# Patient Record
Sex: MALE | Race: BLACK OR AFRICAN AMERICAN | Employment: FULL TIME
[De-identification: names, ages, dates, MRNs, and addresses within clinical notes are randomized per-mention and may not be internally consistent; named-entity substitution may affect disease eponyms.]

---

## 2024-05-28 ENCOUNTER — HOSPITAL ENCOUNTER (EMERGENCY)
Facility: HOSPITAL | Age: 34
Discharge: HOME | End: 2024-05-29

## 2024-05-28 VITALS
TEMPERATURE: 97.9 F | RESPIRATION RATE: 16 BRPM | DIASTOLIC BLOOD PRESSURE: 88 MMHG | BODY MASS INDEX: 21.28 KG/M2 | SYSTOLIC BLOOD PRESSURE: 127 MMHG | WEIGHT: 152 LBS | OXYGEN SATURATION: 97 % | HEART RATE: 96 BPM | HEIGHT: 71 IN

## 2024-05-28 DIAGNOSIS — T16.2XXA FOREIGN BODY OF LEFT EAR, INITIAL ENCOUNTER: Primary | ICD-10-CM

## 2024-05-28 PROCEDURE — 69200 CLEAR OUTER EAR CANAL: CPT

## 2024-05-28 PROCEDURE — 99282 EMERGENCY DEPT VISIT SF MDM: CPT

## 2024-05-28 ASSESSMENT — PAIN - FUNCTIONAL ASSESSMENT: PAIN_FUNCTIONAL_ASSESSMENT: NONE - DENIES PAIN

## 2024-05-29 PROCEDURE — 69200 CLEAR OUTER EAR CANAL: CPT

## 2024-05-29 NOTE — ED TRIAGE NOTES
Patient states that something flew into left ear. Patient states that he can still feel it flying around in ear.

## 2024-05-29 NOTE — ED PROVIDER NOTES
[facility] EMERGENCY DEPT  EMERGENCY DEPARTMENT ENCOUNTER       Pt Name: Artem Gonzalez  MRN: [id number]  Birthdate 1990  Date of evaluation: 5/28/2024  PCP: No primary care provider on file.  Note Started: 12:32 AM 5/29/24     CHIEF COMPLAINT       Chief Complaint   Patient presents with    Foreign Body in Ear        HISTORY OF PRESENT ILLNESS: 1 or more elements      History From: Patient  HPI Limitations: None  Chronic Conditions: None   Social Determinants affecting Dx or Tx: None       Artem Gonzalez is a 34 y.o. male who presents to ED c/o foreign body sensation to left ear.  Patient reports he felt a bug fly into his ear earlier today and it has not come out.  Patient denies headache, fever/chills, URI symptoms.     Nursing Notes were all reviewed and agreed with or any disagreements were addressed in the HPI.    PAST HISTORY     Past Medical History:  No past medical history on file.    Past Surgical History:  No past surgical history on file.    Family History:  No family history on file.    Social History:  Social History     Socioeconomic History    Marital status: Single       Allergies:  No Known Allergies    CURRENT MEDICATIONS      No current facility-administered medications for this encounter.     No current outpatient medications on file.          PHYSICAL EXAM      Vitals:    05/28/24 2251   BP: 127/88   Pulse: 96   Resp: 16   Temp: 97.9 °F (36.6 °C)   TempSrc: Oral   SpO2: 97%   Weight: 68.9 kg (152 lb)   Height: 1.803 m (5' 11\")     Physical Exam  Vitals and nursing note reviewed.   Constitutional:       General: He is not in acute distress.     Appearance: Normal appearance. He is not ill-appearing.   HENT:      Head: Normocephalic and atraumatic.      Right Ear: Tympanic membrane, ear canal and external ear normal.      Left Ear: Tympanic membrane and external ear normal. A foreign body is present.      Ears:      Comments: Winged insect to left ear     Mouth/Throat:      Mouth: Mucous